# Patient Record
Sex: MALE
[De-identification: names, ages, dates, MRNs, and addresses within clinical notes are randomized per-mention and may not be internally consistent; named-entity substitution may affect disease eponyms.]

---

## 2019-06-29 ENCOUNTER — HOSPITAL ENCOUNTER (EMERGENCY)
Dept: HOSPITAL 5 - ED | Age: 39
Discharge: HOME | End: 2019-06-29
Payer: COMMERCIAL

## 2019-06-29 VITALS — DIASTOLIC BLOOD PRESSURE: 60 MMHG | SYSTOLIC BLOOD PRESSURE: 105 MMHG

## 2019-06-29 DIAGNOSIS — F17.200: ICD-10-CM

## 2019-06-29 DIAGNOSIS — T63.461A: Primary | ICD-10-CM

## 2019-06-29 DIAGNOSIS — F12.10: ICD-10-CM
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
## 2019-06-29 PROCEDURE — 90471 IMMUNIZATION ADMIN: CPT

## 2019-06-29 PROCEDURE — 90715 TDAP VACCINE 7 YRS/> IM: CPT

## 2019-06-29 PROCEDURE — 99282 EMERGENCY DEPT VISIT SF MDM: CPT

## 2019-06-29 NOTE — EVENT NOTE
ED Screening Note


Date of service: 06/29/19


Time: 19:40


ED Screening Note: 





37 y/o male comes in for being bite by many yellow jackets.  History of hep B.








This initial assessment/diagnostic orders/clinical plan/treatment(s) is/are 

subject to change based on patients health status, clinical progression and re-

assessment by fellow clinical providers in the ED. Further treatment and workup 

at subsequent clinical providers discretion. Patient/guardian urged not to elope

from the ED as their condition may be serious if not clinically assessed and 

managed. 





Initial orders include:

## 2019-06-29 NOTE — EMERGENCY DEPARTMENT REPORT
- General


Chief complaint: Allergic Reaction


Stated complaint: ALLEGRIC REACTION BEE STING


Time Seen by Provider: 06/29/19 21:49


Source: patient


Mode of arrival: Ambulatory


Limitations: No Limitations





- History of Present Illness


Initial comments: 





Patient is a 38-year-old male who presents to the emergency room with complaints

of multiple yellow jacket stings that occurred at 6 PM today.  He states he was 

outside mowing the grass when he ran over a yellow jacket nest.  He states he 

initially had redness of the skin diffusely, itching, nausea and 2 episodes of 

emesis.  He does not report any throat swelling, shortness of breath, wheezing, 

difficulty breathing.  He has a past medical history of hepatitis B.  He states 

he believes he has an allergy to penicillin as a child.





- Related Data


                                  Previous Rx's











 Medication  Instructions  Recorded  Last Taken  Type


 


Hydrocortisone [Hydrocortisone 1 applicatio TP BID #1 oint...g. 06/29/19 Unknown

 Rx





2.5% OINT]    


 


Prednisone [predniSONE 10 mg 10 mg PO .TAPER #1 tab.ds.pk 06/29/19 Unknown Rx





(6-Day Pack, 21 Tabs)]    


 


diphenhydrAMINE [Benadryl CAP] 25 mg PO Q6HR PRN #30 capsule 06/29/19 Unknown Rx











                                    Allergies











Allergy/AdvReac Type Severity Reaction Status Date / Time


 


No Known Allergies Allergy   Unverified 06/29/19 19:40














Abscess Boil HPI





- HPI


Chief Complaint: Allergic Reaction


Stated Complaint: ALLEGRIC REACTION BEE STING


Time Seen by Provider: 06/29/19 21:49


Home Medications: 


                                  Previous Rx's











 Medication  Instructions  Recorded  Last Taken  Type


 


Hydrocortisone [Hydrocortisone 1 applicatio TP BID #1 oint...g. 06/29/19 Unknown

 Rx





2.5% OINT]    


 


Prednisone [predniSONE 10 mg 10 mg PO .TAPER #1 tab.ds.pk 06/29/19 Unknown Rx





(6-Day Pack, 21 Tabs)]    


 


diphenhydrAMINE [Benadryl CAP] 25 mg PO Q6HR PRN #30 capsule 06/29/19 Unknown Rx











Allergies/Adverse Reactions: 


                                    Allergies











Allergy/AdvReac Type Severity Reaction Status Date / Time


 


No Known Allergies Allergy   Unverified 06/29/19 19:40














ED Review of Systems


ROS: 


Stated complaint: ALLEGRIC REACTION BEE STING


Other details as noted in HPI





Comment: All other systems reviewed and negative





ED Past Medical Hx





- Past Medical History


Previous Medical History?: Yes


Additional medical history: Hep B





- Surgical History


Past Surgical History?: Yes


Additional Surgical History: Jaw s/p





- Social History


Smoking Status: Current Every Day Smoker


Substance Use Type: Marijuana





- Medications


Home Medications: 


                                Home Medications











 Medication  Instructions  Recorded  Confirmed  Last Taken  Type


 


Hydrocortisone [Hydrocortisone 1 applicatio TP BID #1 oint...g. 06/29/19  

Unknown Rx





2.5% OINT]     


 


Prednisone [predniSONE 10 mg 10 mg PO .TAPER #1 tab.ds.pk 06/29/19  Unknown Rx





(6-Day Pack, 21 Tabs)]     


 


diphenhydrAMINE [Benadryl CAP] 25 mg PO Q6HR PRN #30 capsule 06/29/19  Unknown 

Rx














ED Physical Exam





- General


Limitations: No Limitations


General appearance: alert, in no apparent distress





- Head


Head exam: Present: atraumatic, normocephalic





- Eye


Eye exam: Present: normal appearance, PERRL





- ENT


ENT exam: Present: mucous membranes moist, other (no angioedema, uvula is 

midline, no uvular edema, no tongue edema )





- Respiratory


Respiratory exam: Present: normal lung sounds bilaterally.  Absent: respiratory 

distress, wheezes, rales, rhonchi, stridor, accessory muscle use, decreased 

breath sounds, prolonged expiratory





- Cardiovascular


Cardiovascular Exam: Present: regular rate, normal rhythm, normal heart sounds. 

Absent: systolic murmur, diastolic murmur, rubs, gallop





- Neurological Exam


Neurological exam: Present: alert, oriented X3





- Psychiatric


Psychiatric exam: Present: normal affect, normal mood





- Skin


Skin exam: Present: warm, dry, other (small erythematous macules present to the 

bilateral UE, LE, and underneath the eyes, light improving erythema present 

diffusely on the BUE/BLE, and underneath the eyes, no drainage, no pustules, no 

skin denuding, no blistering)





ED Course


                                   Vital Signs











  06/29/19 06/29/19 06/29/19





  19:40 19:45 22:25


 


Temperature 98.4 F  


 


Pulse Rate 97 H  79


 


Respiratory 16 16 18





Rate   


 


Blood Pressure 121/74  


 


Blood Pressure   105/60





[Right]   


 


O2 Sat by Pulse 94  96





Oximetry   














ED Medical Decision Making





- Medical Decision Making





Patient is a 38-year-old male who presents to the emergency room with complaints

 of multiple yellow jacket stings that occurred at 6 PM today.  He states he was

 outside mowing the grass when he ran over a yellow jacket nest.  He states he 

initially had redness of the skin diffusely, itching, nausea and 2 episodes of 

emesis.  He does not report any throat swelling, shortness of breath, wheezing, 

difficulty breathing.  He has a past medical history of hepatitis B.  He states 

he believes he has an allergy to penicillin as a child. on exam: small 

erythematous macules present to the bilateral UE, LE, and underneath the eyes, 

light improving erythema present diffusely on the BUE/BLE, and underneath the 

eyes, no drainage, no pustules, no skin denuding, no blistering, no angioedema, 

no uvular edema, uvula is midline, no tongue edema, clear breath sounds BL. VSS.

 pt given benadryl, pepcid, prednisone, ibuprofen, tetanus IM. he states his sx 

significantly improved and he felt much better and was no longer itching. pt 

given prescription for benadryl, medrol dose pack, and hydrocortisone cream. 

advsied to please use medication as prescribed.  Do Not put ointment on the 

face.  Follow up with primary care doctor for reevaluation in the next 2-3 days.

  Return to the emergency room for any new or worsening symptoms.


Critical care attestation.: 


If time is entered above; I have spent that time in minutes in the direct care 

of this critically ill patient, excluding procedure time.








ED Disposition


Clinical Impression: 


Yellow jacket sting


Qualifiers:


 Encounter type: initial encounter Injury intent: accidental or unintentional 

Qualified Code(s): T63.461A - Toxic effect of venom of wasps, accidental 

(unintentional), initial encounter





Disposition: DC-01 TO HOME OR SELFCARE


Is pt being admited?: No


Does the pt Need Aspirin: No


Condition: Stable


Instructions:  Insect Bite or Sting (ED)


Additional Instructions: 


Please use medication as prescribed.  Do Not put ointment on the face.  Follow 

up with primary care doctor for reevaluation in the next 2-3 days.  Return to 

the emergency room for any new or worsening symptoms.


Prescriptions: 


diphenhydrAMINE [Benadryl CAP] 25 mg PO Q6HR PRN #30 capsule


 PRN Reason: Itching


Hydrocortisone [Hydrocortisone 2.5% OINT] 1 applicatio TP BID #1 oint...g.


Prednisone [predniSONE 10 mg (6-Day Pack, 21 Tabs)] 10 mg PO .TAPER #1 tab.ds.pk


Referrals: 


[facility] [place] [Primary Care Provider] - 2-3 Days


[facility] [Outside] - 2-3 Days


[facility] [Outside] - 2-3 Days


Forms:  Work/School Release Form(ED)


Time of Disposition: 22:06


Print Language: ENGLISH